# Patient Record
Sex: FEMALE | Employment: UNEMPLOYED | URBAN - METROPOLITAN AREA
[De-identification: names, ages, dates, MRNs, and addresses within clinical notes are randomized per-mention and may not be internally consistent; named-entity substitution may affect disease eponyms.]

---

## 2020-10-01 ENCOUNTER — OFFICE VISIT (OUTPATIENT)
Dept: URBAN - METROPOLITAN AREA CLINIC 68 | Facility: CLINIC | Age: 60
End: 2020-10-01

## 2020-10-28 ENCOUNTER — OFFICE VISIT (OUTPATIENT)
Dept: URBAN - METROPOLITAN AREA CLINIC 68 | Facility: CLINIC | Age: 60
End: 2020-10-28

## 2021-03-17 ENCOUNTER — OFFICE VISIT (OUTPATIENT)
Dept: URBAN - METROPOLITAN AREA CLINIC 68 | Facility: CLINIC | Age: 61
End: 2021-03-17

## 2021-04-14 ENCOUNTER — OFFICE VISIT (OUTPATIENT)
Dept: URBAN - METROPOLITAN AREA CLINIC 68 | Facility: CLINIC | Age: 61
End: 2021-04-14

## 2021-10-27 ENCOUNTER — OFFICE VISIT (OUTPATIENT)
Dept: URBAN - METROPOLITAN AREA CLINIC 68 | Facility: CLINIC | Age: 61
End: 2021-10-27

## 2021-11-12 ENCOUNTER — OFFICE VISIT (OUTPATIENT)
Dept: URBAN - METROPOLITAN AREA SURGERY CENTER 12 | Facility: SURGERY CENTER | Age: 61
End: 2021-11-12

## 2021-11-15 ENCOUNTER — LAB OUTSIDE AN ENCOUNTER (OUTPATIENT)
Dept: URBAN - METROPOLITAN AREA CLINIC 68 | Facility: CLINIC | Age: 61
End: 2021-11-15

## 2021-11-15 ENCOUNTER — TELEPHONE ENCOUNTER (OUTPATIENT)
Dept: URBAN - METROPOLITAN AREA CLINIC 68 | Facility: CLINIC | Age: 61
End: 2021-11-15

## 2021-11-15 LAB
01: (no result)
01: (no result)

## 2022-03-15 ENCOUNTER — OFFICE VISIT (OUTPATIENT)
Dept: URBAN - METROPOLITAN AREA CLINIC 68 | Facility: CLINIC | Age: 62
End: 2022-03-15

## 2022-06-04 ENCOUNTER — TELEPHONE ENCOUNTER (OUTPATIENT)
Dept: URBAN - METROPOLITAN AREA CLINIC 68 | Facility: CLINIC | Age: 62
End: 2022-06-04

## 2022-06-04 RX ORDER — SODIUM SULFATE, POTASSIUM SULFATE, MAGNESIUM SULFATE 17.5; 3.13; 1.6 G/ML; G/ML; G/ML
SOLUTION, CONCENTRATE ORAL AS DIRECTED
Qty: 1 | Refills: 0 | OUTPATIENT
Start: 2017-03-08 | End: 2018-04-16

## 2022-06-04 RX ORDER — OMEPRAZOLE 20 MG/1
TABLET, DELAYED RELEASE ORAL
Qty: 30 | Refills: 30 | OUTPATIENT
Start: 2012-03-21 | End: 2015-10-07

## 2022-06-04 RX ORDER — ASPIRIN 81 MG/1
ASPIRIN EC( 81MG ORAL  ONE DAILY ) INACTIVE -HX ENTRY TABLET ORAL
OUTPATIENT
Start: 2015-10-07

## 2022-06-04 RX ORDER — OMEPRAZOLE 20 MG/1
CAPSULE, DELAYED RELEASE ORAL
Qty: 90 | Refills: 90 | OUTPATIENT
Start: 2014-03-12 | End: 2015-10-07

## 2022-06-04 RX ORDER — SODIUM SULFATE, MAGNESIUM SULFATE, AND POTASSIUM CHLORIDE 17.75; 2.7; 2.25 G/1; G/1; G/1
TABLET ORAL AS DIRECTED
Qty: 1 | Refills: 0 | OUTPATIENT
Start: 2021-10-27 | End: 2021-10-28

## 2022-06-04 RX ORDER — POLYETHYLENE GLYCOL 3350, SODIUM SULFATE, SODIUM CHLORIDE, POTASSIUM CHLORIDE, ASCORBIC ACID, SODIUM ASCORBATE 7.5-2.691G
KIT ORAL
Qty: 1 | Refills: 0 | OUTPATIENT
Start: 2012-02-09 | End: 2012-02-27

## 2022-06-05 ENCOUNTER — TELEPHONE ENCOUNTER (OUTPATIENT)
Dept: URBAN - METROPOLITAN AREA CLINIC 68 | Facility: CLINIC | Age: 62
End: 2022-06-05

## 2022-06-05 RX ORDER — CALCIUM CARBONATE 600 MG
CALCIUM 600( 1500MG ORAL  DAILY ) ACTIVE -HX ENTRY TABLET ORAL DAILY
Status: ACTIVE | COMMUNITY
Start: 2021-10-27

## 2022-06-05 RX ORDER — MULTIVITAMIN
MULTIPLE VITAMIN(  ORAL  DAILY ) ACTIVE -HX ENTRY TABLET ORAL DAILY
Status: ACTIVE | COMMUNITY
Start: 2021-10-27

## 2022-06-05 RX ORDER — OMEPRAZOLE 20 MG/1
OMEPRAZOLE( 20MG ORAL  DAILY ) ACTIVE -HX ENTRY CAPSULE, DELAYED RELEASE ORAL DAILY
Status: ACTIVE | COMMUNITY
Start: 2021-10-27

## 2022-06-25 ENCOUNTER — TELEPHONE ENCOUNTER (OUTPATIENT)
Age: 62
End: 2022-06-25

## 2022-06-25 RX ORDER — PROCHLORPERAZINE EDISYLATE 5 MG/ML
COMPAZINE(    TAKE ONE 1 HOUR PRIOR TO FLEETS PREP ) INACTIVE -HX ENTRY INJECTION INTRAMUSCULAR; INTRAVENOUS
OUTPATIENT
Start: 2007-04-25

## 2022-06-25 RX ORDER — IBUPROFEN, ACETAMINOPHEN 125; 250 MG/1; MG/1
ADVIL(    2 PO DAILY ) INACTIVE -HX ENTRY TABLET, FILM COATED ORAL
OUTPATIENT
Start: 2007-04-25

## 2022-06-25 RX ORDER — OMEPRAZOLE 20 MG/1
CAPSULE, DELAYED RELEASE ORAL
Qty: 90 | Refills: 90 | OUTPATIENT
Start: 2014-03-12 | End: 2015-10-07

## 2022-06-25 RX ORDER — OMEPRAZOLE 20 MG/1
TABLET, DELAYED RELEASE ORAL
Qty: 30 | Refills: 30 | OUTPATIENT
Start: 2012-03-21 | End: 2015-10-07

## 2022-06-25 RX ORDER — ASPIRIN 81 MG
ASPIRIN EC( 81MG ORAL  ONE DAILY ) INACTIVE -HX ENTRY TABLET, DELAYED RELEASE (ENTERIC COATED) ORAL
OUTPATIENT
Start: 2015-10-07

## 2022-06-25 RX ORDER — SODIUM SULFATE, POTASSIUM SULFATE, MAGNESIUM SULFATE 17.5; 3.13; 1.6 G/ML; G/ML; G/ML
SOLUTION, CONCENTRATE ORAL AS DIRECTED
Qty: 1 | Refills: 0 | OUTPATIENT
Start: 2017-03-08 | End: 2018-04-16

## 2022-06-25 RX ORDER — POLYETHYLENE GLYCOL 3350, SODIUM SULFATE, SODIUM CHLORIDE, POTASSIUM CHLORIDE, ASCORBIC ACID, SODIUM ASCORBATE 7.5-2.691G
KIT ORAL
Qty: 1 | Refills: 0 | OUTPATIENT
Start: 2012-02-09 | End: 2012-02-27

## 2022-06-25 RX ORDER — SODIUM SULFATE, MAGNESIUM SULFATE, AND POTASSIUM CHLORIDE 17.75; 2.7; 2.25 G/1; G/1; G/1
TABLET ORAL AS DIRECTED
Qty: 1 | Refills: 0 | OUTPATIENT
Start: 2021-10-27 | End: 2021-10-28

## 2022-06-25 RX ORDER — IBUPROFEN, ACETAMINOPHEN 125; 250 MG/1; MG/1
ADVIL(    PRN ) INACTIVE -HX ENTRY TABLET, FILM COATED ORAL PRN
OUTPATIENT
Start: 2009-05-13

## 2022-06-26 ENCOUNTER — TELEPHONE ENCOUNTER (OUTPATIENT)
Age: 62
End: 2022-06-26

## 2022-06-26 RX ORDER — OMEPRAZOLE 20 MG/1
OMEPRAZOLE( 20MG ORAL  DAILY ) ACTIVE -HX ENTRY CAPSULE, DELAYED RELEASE ORAL DAILY
Status: ACTIVE | COMMUNITY
Start: 2021-10-27

## 2024-10-29 ENCOUNTER — DASHBOARD ENCOUNTERS (OUTPATIENT)
Age: 64
End: 2024-10-29

## 2024-10-29 ENCOUNTER — OFFICE VISIT (OUTPATIENT)
Dept: URBAN - METROPOLITAN AREA CLINIC 68 | Facility: CLINIC | Age: 64
End: 2024-10-29

## 2024-10-29 VITALS
OXYGEN SATURATION: 98 % | WEIGHT: 140 LBS | HEIGHT: 64 IN | TEMPERATURE: 97.7 F | BODY MASS INDEX: 23.9 KG/M2 | HEART RATE: 67 BPM

## 2024-10-29 RX ORDER — ALUMINUM ZIRCONIUM TRICHLOROHYDREX GLY 0.19 G/G
1 TABLET 30 MINUTES BEFORE MORNING MEAL STICK TOPICAL ONCE A DAY
Qty: 30 | OUTPATIENT
Start: 2024-10-29

## 2024-10-29 NOTE — HPI-TODAY'S VISIT:
PT with some throat irritation no alarm symptoms, no weight loss or change in BM's, last EGD 2021 neg for Almeida's  some history of GERD in the past w similar symptoms  ON no meds, took Prilosec in the past FAM history of colon ca father 58 yp survived, brother had polyps age 60 Brother succumbed pand ca age 50s    Normal labs no anemia and no LOWER GI alarm symptoms at all  Due Colon surveillance 11/2026  REC Prilosec OTC for 4 weeks then if symptoms persist needs EGD   Schedule EGD and pt will call w persistent or recurrence symptoms

## 2024-12-05 ENCOUNTER — OFFICE VISIT (OUTPATIENT)
Dept: URBAN - METROPOLITAN AREA CLINIC 68 | Facility: CLINIC | Age: 64
End: 2024-12-05

## 2025-01-07 ENCOUNTER — OFFICE VISIT (OUTPATIENT)
Dept: URBAN - METROPOLITAN AREA CLINIC 68 | Facility: CLINIC | Age: 65
End: 2025-01-07
Payer: MEDICARE

## 2025-01-07 VITALS
HEART RATE: 66 BPM | DIASTOLIC BLOOD PRESSURE: 80 MMHG | SYSTOLIC BLOOD PRESSURE: 130 MMHG | TEMPERATURE: 97.8 F | OXYGEN SATURATION: 98 % | HEIGHT: 64 IN | WEIGHT: 142 LBS | BODY MASS INDEX: 24.24 KG/M2

## 2025-01-07 DIAGNOSIS — K21.9 GASTRO-ESOPHAGEAL REFLUX DISEASE WITHOUT ESOPHAGITIS: ICD-10-CM

## 2025-01-07 DIAGNOSIS — Z80.0 FAMILY HISTORY OF MALIGNANT NEOPLASM OF DIGESTIVE ORGANS: ICD-10-CM

## 2025-01-07 DIAGNOSIS — Z86.0100 HISTORY OF COLON POLYPS: ICD-10-CM

## 2025-01-07 PROCEDURE — 99213 OFFICE O/P EST LOW 20 MIN: CPT | Performed by: SPECIALIST

## 2025-01-07 RX ORDER — ALUMINUM ZIRCONIUM TRICHLOROHYDREX GLY 0.19 G/G
1 TABLET 30 MINUTES BEFORE MORNING MEAL STICK TOPICAL ONCE A DAY
Qty: 30 | Status: ACTIVE | COMMUNITY
Start: 2024-10-29

## 2025-01-07 RX ORDER — ALUMINUM ZIRCONIUM TRICHLOROHYDREX GLY 0.19 G/G
1 TABLET 30 MINUTES BEFORE MORNING MEAL STICK TOPICAL ONCE A DAY
Qty: 30 | OUTPATIENT

## 2025-01-07 NOTE — HPI-TODAY'S VISIT:
1/7/2025 took 4 weeks of prilosec , no furhter symptoms , no dysphagia Discussed family history father colon ca survived early 50 s  last colon no poliyps  NO alarm symptoms   FU egd if symptoms recur Colon for LGI alarm symptoms   NO other recommedations  PT with some throat irritation no alarm symptoms, no weight loss or change in BM's, last EGD 2021 neg for Almeida's  some history of GERD in the past w similar symptoms  ON no meds, took Prilosec in the past FAM history of colon ca father 58 yp survived, brother had polyps age 60 Brother succumbed pand ca age 50s    Normal labs no anemia and no LOWER GI alarm symptoms at all  Due Colon surveillance 11/2026  REC Prilosec OTC for 4 weeks then if symptoms persist needs EGD   Schedule EGD and pt will call w persistent or recurrence symptoms PT with some throat irritation no alarm symptoms, no weight loss or change in BM's, last EGD 2021 neg for Almeida's  some history of GERD in the past w similar symptoms  ON no meds, took Prilosec in the past FAM history of colon ca father 58 yp survived, brother had polyps age 60 Brother succumbed pand ca age 50s    Normal labs no anemia and no LOWER GI alarm symptoms at all  Due Colon surveillance 11/2026  REC Prilosec OTC for 4 weeks then if symptoms persist needs EGD   Schedule EGD and pt will call w persistent or recurrence symptoms